# Patient Record
Sex: MALE | Race: WHITE | URBAN - METROPOLITAN AREA
[De-identification: names, ages, dates, MRNs, and addresses within clinical notes are randomized per-mention and may not be internally consistent; named-entity substitution may affect disease eponyms.]

---

## 2020-07-30 DIAGNOSIS — Z11.59 ENCOUNTER FOR SCREENING FOR OTHER VIRAL DISEASES: Primary | ICD-10-CM

## 2020-08-17 DIAGNOSIS — Z11.59 ENCOUNTER FOR SCREENING FOR OTHER VIRAL DISEASES: ICD-10-CM

## 2020-08-17 ASSESSMENT — MIFFLIN-ST. JEOR: SCORE: 1820.76

## 2020-08-18 ENCOUNTER — ANESTHESIA EVENT (OUTPATIENT)
Dept: SURGERY | Facility: AMBULATORY SURGERY CENTER | Age: 35
End: 2020-08-18

## 2020-08-18 LAB
SARS-COV-2 RNA SPEC QL NAA+PROBE: NOT DETECTED
SPECIMEN SOURCE: NORMAL

## 2020-08-18 SDOH — HEALTH STABILITY: MENTAL HEALTH: HOW OFTEN DO YOU HAVE A DRINK CONTAINING ALCOHOL?: NEVER

## 2020-08-19 ENCOUNTER — ANESTHESIA (OUTPATIENT)
Dept: SURGERY | Facility: AMBULATORY SURGERY CENTER | Age: 35
End: 2020-08-19

## 2020-08-19 ENCOUNTER — HOSPITAL ENCOUNTER (OUTPATIENT)
Facility: AMBULATORY SURGERY CENTER | Age: 35
Discharge: HOME | End: 2020-08-19
Attending: PLASTIC SURGERY | Admitting: PLASTIC SURGERY

## 2020-08-19 VITALS
OXYGEN SATURATION: 96 % | TEMPERATURE: 97.6 F | RESPIRATION RATE: 16 BRPM | HEIGHT: 70 IN | WEIGHT: 195 LBS | DIASTOLIC BLOOD PRESSURE: 79 MMHG | HEART RATE: 106 BPM | BODY MASS INDEX: 27.92 KG/M2 | SYSTOLIC BLOOD PRESSURE: 118 MMHG

## 2020-08-19 DIAGNOSIS — R52 PAIN: Primary | ICD-10-CM

## 2020-08-19 DIAGNOSIS — B99.9 INFECTION: ICD-10-CM

## 2020-08-19 DIAGNOSIS — R11.0 NAUSEA AFTER ANESTHESIA, INITIAL ENCOUNTER: ICD-10-CM

## 2020-08-19 DIAGNOSIS — T88.59XA NAUSEA AFTER ANESTHESIA, INITIAL ENCOUNTER: ICD-10-CM

## 2020-08-19 PROCEDURE — 36000148

## 2020-08-19 PROCEDURE — 88305 TISSUE EXAM BY PATHOLOGIST: CPT | Performed by: PLASTIC SURGERY

## 2020-08-19 PROCEDURE — 36000152 ZZH SURGERY LEVEL COSMETIC EACH ADDL 30 MIN OVER QUOTE ESTIMATE

## 2020-08-19 PROCEDURE — G8907 PT DOC NO EVENTS ON DISCHARG: HCPCS

## 2020-08-19 PROCEDURE — G8916 PT W IV AB GIVEN ON TIME: HCPCS

## 2020-08-19 RX ORDER — SODIUM CHLORIDE, SODIUM LACTATE, POTASSIUM CHLORIDE, CALCIUM CHLORIDE 600; 310; 30; 20 MG/100ML; MG/100ML; MG/100ML; MG/100ML
INJECTION, SOLUTION INTRAVENOUS CONTINUOUS
Status: DISCONTINUED | OUTPATIENT
Start: 2020-08-19 | End: 2020-08-20 | Stop reason: HOSPADM

## 2020-08-19 RX ORDER — OXYCODONE AND ACETAMINOPHEN 5; 325 MG/1; MG/1
2 TABLET ORAL
Status: DISCONTINUED | OUTPATIENT
Start: 2020-08-19 | End: 2020-08-20 | Stop reason: HOSPADM

## 2020-08-19 RX ORDER — PROPOFOL 10 MG/ML
INJECTION, EMULSION INTRAVENOUS PRN
Status: DISCONTINUED | OUTPATIENT
Start: 2020-08-19 | End: 2020-08-19

## 2020-08-19 RX ORDER — ONDANSETRON 2 MG/ML
INJECTION INTRAMUSCULAR; INTRAVENOUS PRN
Status: DISCONTINUED | OUTPATIENT
Start: 2020-08-19 | End: 2020-08-19

## 2020-08-19 RX ORDER — ESMOLOL HYDROCHLORIDE 10 MG/ML
INJECTION INTRAVENOUS PRN
Status: DISCONTINUED | OUTPATIENT
Start: 2020-08-19 | End: 2020-08-19

## 2020-08-19 RX ORDER — DEXAMETHASONE SODIUM PHOSPHATE 4 MG/ML
10 INJECTION, SOLUTION INTRA-ARTICULAR; INTRALESIONAL; INTRAMUSCULAR; INTRAVENOUS; SOFT TISSUE
Status: COMPLETED | OUTPATIENT
Start: 2020-08-19 | End: 2020-08-19

## 2020-08-19 RX ORDER — CEFAZOLIN SODIUM 1 G/3ML
1 INJECTION, POWDER, FOR SOLUTION INTRAMUSCULAR; INTRAVENOUS SEE ADMIN INSTRUCTIONS
Status: DISCONTINUED | OUTPATIENT
Start: 2020-08-19 | End: 2020-08-20 | Stop reason: HOSPADM

## 2020-08-19 RX ORDER — ACETAMINOPHEN 500 MG
500-1000 TABLET ORAL EVERY 6 HOURS PRN
COMMUNITY

## 2020-08-19 RX ORDER — GINSENG 100 MG
CAPSULE ORAL PRN
Status: DISCONTINUED | OUTPATIENT
Start: 2020-08-19 | End: 2020-08-19 | Stop reason: HOSPADM

## 2020-08-19 RX ORDER — OXYCODONE HYDROCHLORIDE 5 MG/1
5 TABLET ORAL EVERY 4 HOURS PRN
Status: DISCONTINUED | OUTPATIENT
Start: 2020-08-19 | End: 2020-08-20 | Stop reason: HOSPADM

## 2020-08-19 RX ORDER — PROPOFOL 10 MG/ML
INJECTION, EMULSION INTRAVENOUS CONTINUOUS PRN
Status: DISCONTINUED | OUTPATIENT
Start: 2020-08-19 | End: 2020-08-19

## 2020-08-19 RX ORDER — LABETALOL 20 MG/4 ML (5 MG/ML) INTRAVENOUS SYRINGE
PRN
Status: DISCONTINUED | OUTPATIENT
Start: 2020-08-19 | End: 2020-08-19

## 2020-08-19 RX ORDER — FENTANYL CITRATE 50 UG/ML
INJECTION, SOLUTION INTRAMUSCULAR; INTRAVENOUS PRN
Status: DISCONTINUED | OUTPATIENT
Start: 2020-08-19 | End: 2020-08-19

## 2020-08-19 RX ORDER — BUPIVACAINE HYDROCHLORIDE AND EPINEPHRINE 2.5; 5 MG/ML; UG/ML
INJECTION, SOLUTION INFILTRATION; PERINEURAL PRN
Status: DISCONTINUED | OUTPATIENT
Start: 2020-08-19 | End: 2020-08-19 | Stop reason: HOSPADM

## 2020-08-19 RX ORDER — OXYCODONE AND ACETAMINOPHEN 5; 325 MG/1; MG/1
1-2 TABLET ORAL EVERY 4 HOURS PRN
Qty: 20 TABLET | Refills: 0
Start: 2020-08-19

## 2020-08-19 RX ORDER — FENTANYL CITRATE 50 UG/ML
25-50 INJECTION, SOLUTION INTRAMUSCULAR; INTRAVENOUS EVERY 5 MIN PRN
Status: DISCONTINUED | OUTPATIENT
Start: 2020-08-19 | End: 2020-08-20 | Stop reason: HOSPADM

## 2020-08-19 RX ORDER — LIDOCAINE 40 MG/G
CREAM TOPICAL
Status: DISCONTINUED | OUTPATIENT
Start: 2020-08-19 | End: 2020-08-20 | Stop reason: HOSPADM

## 2020-08-19 RX ORDER — ONDANSETRON 4 MG/1
4 TABLET, ORALLY DISINTEGRATING ORAL
Status: DISCONTINUED | OUTPATIENT
Start: 2020-08-19 | End: 2020-08-20 | Stop reason: HOSPADM

## 2020-08-19 RX ORDER — HYDROXYZINE HYDROCHLORIDE 25 MG/1
25 TABLET, FILM COATED ORAL
Status: DISCONTINUED | OUTPATIENT
Start: 2020-08-19 | End: 2020-08-20 | Stop reason: HOSPADM

## 2020-08-19 RX ORDER — NALOXONE HYDROCHLORIDE 0.4 MG/ML
.1-.4 INJECTION, SOLUTION INTRAMUSCULAR; INTRAVENOUS; SUBCUTANEOUS
Status: DISCONTINUED | OUTPATIENT
Start: 2020-08-19 | End: 2020-08-20 | Stop reason: HOSPADM

## 2020-08-19 RX ORDER — ONDANSETRON 2 MG/ML
4 INJECTION INTRAMUSCULAR; INTRAVENOUS EVERY 30 MIN PRN
Status: DISCONTINUED | OUTPATIENT
Start: 2020-08-19 | End: 2020-08-20 | Stop reason: HOSPADM

## 2020-08-19 RX ORDER — IBUPROFEN 200 MG
200 TABLET ORAL EVERY 4 HOURS PRN
COMMUNITY

## 2020-08-19 RX ORDER — LIDOCAINE HYDROCHLORIDE 20 MG/ML
INJECTION, SOLUTION INFILTRATION; PERINEURAL PRN
Status: DISCONTINUED | OUTPATIENT
Start: 2020-08-19 | End: 2020-08-19

## 2020-08-19 RX ORDER — HYDROXYZINE PAMOATE 25 MG/1
25 CAPSULE ORAL EVERY 6 HOURS PRN
Qty: 30 CAPSULE | Refills: 0
Start: 2020-08-19

## 2020-08-19 RX ORDER — ONDANSETRON 4 MG/1
4 TABLET, ORALLY DISINTEGRATING ORAL EVERY 30 MIN PRN
Status: DISCONTINUED | OUTPATIENT
Start: 2020-08-19 | End: 2020-08-20 | Stop reason: HOSPADM

## 2020-08-19 RX ORDER — HYDROMORPHONE HYDROCHLORIDE 1 MG/ML
.3-.5 INJECTION, SOLUTION INTRAMUSCULAR; INTRAVENOUS; SUBCUTANEOUS EVERY 10 MIN PRN
Status: DISCONTINUED | OUTPATIENT
Start: 2020-08-19 | End: 2020-08-20 | Stop reason: HOSPADM

## 2020-08-19 RX ORDER — CEPHALEXIN 500 MG/1
500 CAPSULE ORAL 2 TIMES DAILY
Qty: 28 CAPSULE | Refills: 0
Start: 2020-08-19 | End: 2020-08-26

## 2020-08-19 RX ORDER — ONDANSETRON 4 MG/1
4-8 TABLET, ORALLY DISINTEGRATING ORAL EVERY 8 HOURS PRN
Qty: 4 TABLET | Refills: 0
Start: 2020-08-19

## 2020-08-19 RX ORDER — CEFAZOLIN SODIUM 2 G/100ML
2 INJECTION, SOLUTION INTRAVENOUS
Status: COMPLETED | OUTPATIENT
Start: 2020-08-19 | End: 2020-08-19

## 2020-08-19 RX ORDER — ACETAMINOPHEN 325 MG/1
975 TABLET ORAL ONCE
Status: COMPLETED | OUTPATIENT
Start: 2020-08-19 | End: 2020-08-19

## 2020-08-19 RX ORDER — MEPERIDINE HYDROCHLORIDE 25 MG/ML
12.5 INJECTION INTRAMUSCULAR; INTRAVENOUS; SUBCUTANEOUS
Status: DISCONTINUED | OUTPATIENT
Start: 2020-08-19 | End: 2020-08-20 | Stop reason: HOSPADM

## 2020-08-19 RX ADMIN — Medication 0.5 MG: at 13:27

## 2020-08-19 RX ADMIN — CEFAZOLIN SODIUM 1 G: 2 INJECTION, SOLUTION INTRAVENOUS at 11:29

## 2020-08-19 RX ADMIN — FENTANYL CITRATE 50 MCG: 50 INJECTION, SOLUTION INTRAMUSCULAR; INTRAVENOUS at 07:44

## 2020-08-19 RX ADMIN — Medication 1 MG: at 09:37

## 2020-08-19 RX ADMIN — OXYCODONE HYDROCHLORIDE 5 MG: 5 TABLET ORAL at 15:06

## 2020-08-19 RX ADMIN — Medication 20 MG: at 07:48

## 2020-08-19 RX ADMIN — SODIUM CHLORIDE, SODIUM LACTATE, POTASSIUM CHLORIDE, CALCIUM CHLORIDE: 600; 310; 30; 20 INJECTION, SOLUTION INTRAVENOUS at 07:14

## 2020-08-19 RX ADMIN — ESMOLOL HYDROCHLORIDE 20 MG: 10 INJECTION INTRAVENOUS at 09:33

## 2020-08-19 RX ADMIN — SODIUM CHLORIDE, SODIUM LACTATE, POTASSIUM CHLORIDE, CALCIUM CHLORIDE: 600; 310; 30; 20 INJECTION, SOLUTION INTRAVENOUS at 09:25

## 2020-08-19 RX ADMIN — SODIUM CHLORIDE, SODIUM LACTATE, POTASSIUM CHLORIDE, CALCIUM CHLORIDE: 600; 310; 30; 20 INJECTION, SOLUTION INTRAVENOUS at 13:02

## 2020-08-19 RX ADMIN — CEFAZOLIN SODIUM 2 G: 2 INJECTION, SOLUTION INTRAVENOUS at 07:29

## 2020-08-19 RX ADMIN — DEXAMETHASONE SODIUM PHOSPHATE 10 MG: 4 INJECTION, SOLUTION INTRA-ARTICULAR; INTRALESIONAL; INTRAMUSCULAR; INTRAVENOUS; SOFT TISSUE at 07:25

## 2020-08-19 RX ADMIN — Medication 1 MG: at 07:49

## 2020-08-19 RX ADMIN — ONDANSETRON 4 MG: 2 INJECTION INTRAMUSCULAR; INTRAVENOUS at 13:24

## 2020-08-19 RX ADMIN — Medication 20 MG: at 09:15

## 2020-08-19 RX ADMIN — Medication 20 MG: at 11:07

## 2020-08-19 RX ADMIN — SODIUM CHLORIDE, SODIUM LACTATE, POTASSIUM CHLORIDE, CALCIUM CHLORIDE: 600; 310; 30; 20 INJECTION, SOLUTION INTRAVENOUS at 12:02

## 2020-08-19 RX ADMIN — ESMOLOL HYDROCHLORIDE 10 MG: 10 INJECTION INTRAVENOUS at 09:14

## 2020-08-19 RX ADMIN — PROPOFOL 200 MCG/KG/MIN: 10 INJECTION, EMULSION INTRAVENOUS at 07:17

## 2020-08-19 RX ADMIN — LABETALOL 20 MG/4 ML (5 MG/ML) INTRAVENOUS SYRINGE 5 MG: at 09:46

## 2020-08-19 RX ADMIN — FENTANYL CITRATE 50 MCG: 50 INJECTION, SOLUTION INTRAMUSCULAR; INTRAVENOUS at 07:17

## 2020-08-19 RX ADMIN — LIDOCAINE HYDROCHLORIDE 60 MG: 20 INJECTION, SOLUTION INFILTRATION; PERINEURAL at 07:17

## 2020-08-19 RX ADMIN — SODIUM CHLORIDE, SODIUM LACTATE, POTASSIUM CHLORIDE, CALCIUM CHLORIDE: 600; 310; 30; 20 INJECTION, SOLUTION INTRAVENOUS at 06:22

## 2020-08-19 RX ADMIN — ESMOLOL HYDROCHLORIDE 10 MG: 10 INJECTION INTRAVENOUS at 09:21

## 2020-08-19 RX ADMIN — CEFAZOLIN SODIUM 1 G: 2 INJECTION, SOLUTION INTRAVENOUS at 09:29

## 2020-08-19 RX ADMIN — CEFAZOLIN SODIUM 1 G: 2 INJECTION, SOLUTION INTRAVENOUS at 13:29

## 2020-08-19 RX ADMIN — SODIUM CHLORIDE, SODIUM LACTATE, POTASSIUM CHLORIDE, CALCIUM CHLORIDE: 600; 310; 30; 20 INJECTION, SOLUTION INTRAVENOUS at 11:15

## 2020-08-19 RX ADMIN — PROPOFOL 200 MG: 10 INJECTION, EMULSION INTRAVENOUS at 07:17

## 2020-08-19 RX ADMIN — Medication 40 MG: at 07:17

## 2020-08-19 RX ADMIN — ACETAMINOPHEN 975 MG: 325 TABLET ORAL at 06:10

## 2020-08-19 RX ADMIN — Medication 20 MG: at 08:41

## 2020-08-19 NOTE — ANESTHESIA POSTPROCEDURE EVALUATION
Anesthesia POST Procedure Evaluation    Patient: Jacob Barrientos   MRN:     [id number] Gender:   male   Age:    34 year old :      1985        Preoperative Diagnosis: Gynecomastia, male [N62]   Procedure(s):  Gynecomastia reduction  LIPOSUCTION chest, abdomen, flanks   Postop Comments: No value filed.     Anesthesia Type: General          Postop Pain Control: Uneventful            Sign Out: Well controlled pain   PONV: No   Neuro/Psych: Uneventful            Sign Out: Acceptable/Baseline neuro status   Airway/Respiratory: Uneventful            Sign Out: Acceptable/Baseline resp. status   CV/Hemodynamics: Uneventful            Sign Out: Acceptable CV status   Other NRE: NONE   DID A NON-ROUTINE EVENT OCCUR? No         Last Anesthesia Record Vitals:  CRNA VITALS  2020 1314 - 2020 1414      2020             Resp Rate (observed):  (!) 2          Last PACU Vitals:  Vitals Value Taken Time   /74 2020  3:00 PM   Temp 36.6  C (97.8  F) 2020  1:45 PM   Pulse 106 2020  3:03 PM   Resp 10 2020  3:03 PM   SpO2 96 % 2020  3:03 PM   Temp src     NIBP     Pulse 94 2020  1:45 PM   SpO2 96 % 2020  1:45 PM   Resp     Temp     Ht Rate     Temp 2     Vitals shown include unvalidated device data.      Electronically Signed By: Carlito Leo MD, 2020, 4:19 PM

## 2020-08-19 NOTE — OR NURSING
Liposuction output 6700ml    Kate Dennis

## 2020-08-19 NOTE — ANESTHESIA CARE TRANSFER NOTE
Patient: Jacob Barrientos    Procedure(s):  Gynecomastia reduction  LIPOSUCTION chest, abdomen, flanks    Diagnosis: Gynecomastia, male [N62]  Diagnosis Additional Information: No value filed.    Anesthesia Type:   No value filed.     Note:  Airway :Face Mask  Patient transferred to:PACU  Comments: Awake, comfortable, sats 99%, Report to RNHandoff Report: Identifed the Patient, Identified the Reponsible Provider, Reviewed the pertinent medical history, Discussed the surgical course, Reviewed Intra-OP anesthesia mangement and issues during anesthesia, Set expectations for post-procedure period and Allowed opportunity for questions and acknowledgement of understanding      Vitals: (Last set prior to Anesthesia Care Transfer)    CRNA VITALS  8/19/2020 1314 - 8/19/2020 1353      8/19/2020             Resp Rate (observed):  (!) 2                Electronically Signed By: KIRILL Roberto CRNA  August 19, 2020  1:53 PM

## 2020-08-19 NOTE — DISCHARGE INSTRUCTIONS
To contact Dr Black call:    [phone] - Day  [phone] - After hours pager    [facility]  Same-Day Surgery   Adult Discharge Orders & Instructions   For 24 hours after surgery  1. Get plenty of rest.  A responsible adult must stay with you for at least 24 hours after you leave the hospital.   2. Do not drive or use heavy equipment.  If you have weakness or tingling, don't drive or use heavy equipment until this feeling goes away.  3. Do not drink alcohol.  4. Avoid strenuous or risky activities.  Ask for help when climbing stairs.   5. You may feel lightheaded.  IF so, sit for a few minutes before standing.  Have someone help you get up.   6. If you have nausea (feel sick to your stomach): Drink only clear liquids such as apple juice, ginger ale, broth or 7-Up.  Rest may also help.  Be sure to drink enough fluids.  Move to a regular diet as you feel able.  7. You may have a slight fever. Call the doctor if your fever is over 100 F (37.7 C) (taken under the tongue) or lasts longer than 24 hours.  8. You may have a dry mouth, a sore throat, muscle aches or trouble sleeping.  These should go away after 24 hours.  9. Do not make important or legal decisions.   Call your doctor for any of the followin.  Signs of infection (fever, growing tenderness at the surgery site, a large amount of drainage or bleeding, severe pain, foul-smelling drainage, redness, swelling).    2. It has been over 8 to 10 hours since surgery and you are still not able to urinate (pass water).    3.  Headache for over 24 hours.

## 2020-08-19 NOTE — ANESTHESIA PREPROCEDURE EVALUATION
"Anesthesia Pre-Procedure Evaluation    Patient: Jacob Barrientos   MRN:     [id number] Gender:   male   Age:    34 year old :      1985        Preoperative Diagnosis: Gynecomastia, male [N62]   Procedure(s):  Gynecomastia reduction  LIPOSUCTION chest, abdomen, flanks     LABS:  CBC: No results found for: WBC, HGB, HCT, PLT  BMP: No results found for: NA, POTASSIUM, CHLORIDE, CO2, BUN, CR, GLC  COAGS: No results found for: PTT, INR, FIBR  POC: No results found for: BGM, HCG, HCGS  OTHER: No results found for: PH, LACT, A1C, CHRISTY, PHOS, MAG, ALBUMIN, PROTTOTAL, ALT, AST, GGT, ALKPHOS, BILITOTAL, BILIDIRECT, LIPASE, AMYLASE, POPEYE, TSH, T4, T3, CRP, SED     Preop Vitals    BP Readings from Last 3 Encounters:   20 118/79    Pulse Readings from Last 3 Encounters:   20 106      Resp Readings from Last 3 Encounters:   20 16    SpO2 Readings from Last 3 Encounters:   20 96%      Temp Readings from Last 1 Encounters:   20 36.4  C (97.6  F)    Ht Readings from Last 1 Encounters:   20 1.778 m (5' 10\")      Wt Readings from Last 1 Encounters:   20 88.5 kg (195 lb)    Estimated body mass index is 27.98 kg/m  as calculated from the following:    Height as of this encounter: 1.778 m (5' 10\").    Weight as of this encounter: 88.5 kg (195 lb).     LDA:  Peripheral IV 20 Left Hand (Active)   Site Assessment WDL 20 1445   Line Status Infusing 20 1345   Phlebitis Scale 0-->no symptoms 20 1345   Infiltration Scale 0 20 1345   Infiltration Site Treatment Method  None 20 0621   Extravasation? No 20 1345   Dressing Intervention New dressing  20 0621   Number of days: 0       Closed/Suction Drain 1 Left Breast Bulb 15 [place] (Active)   Site Description Eccymosis 20 1445   Drainage Appearance Bloody/Bright Red;Serosanguenous 20 1445   Status To bulb suction 20 1445   Output (ml) 15 ml 20 1510   Number of days: 0     "   Closed/Suction Drain 1 Right Breast 15 [place] (Active)   Site Description Eccymosis 08/19/20 1445   Drainage Appearance Bloody/Bright Red;Serosanguenous 08/19/20 1445   Status To bulb suction 08/19/20 1445   Output (ml) 15 ml 08/19/20 1510   Number of days: 0        History reviewed. No pertinent past medical history.   History reviewed. No pertinent surgical history.   No Known Allergies     Anesthesia Evaluation     .             ROS/MED HX    ENT/Pulmonary:  - neg pulmonary ROS     Neurologic:  - neg neurologic ROS     Cardiovascular:  - neg cardiovascular ROS       METS/Exercise Tolerance:     Hematologic:  - neg hematologic  ROS       Musculoskeletal:  - neg musculoskeletal ROS       GI/Hepatic:  - neg GI/hepatic ROS       Renal/Genitourinary:  - ROS Renal section negative       Endo:  - neg endo ROS       Psychiatric:  - neg psychiatric ROS       Infectious Disease:  - neg infectious disease ROS       Malignancy:      - no malignancy   Other:    - neg other ROS                     PHYSICAL EXAM:   Mental Status/Neuro: A/A/O   Airway: Facies: Feasible  Mallampati: I  Mouth/Opening: Full  TM distance: > 6 cm  Neck ROM: Full   Respiratory: Auscultation: CTAB     Resp. Rate: Normal     Resp. Effort: Normal      CV: Rhythm: Regular  Rate: Age appropriate  Heart: Normal Sounds  Edema: None   Comments:      Dental: Normal Dentition                Assessment:   ASA SCORE: 1    H&P: History and physical reviewed and following examination; no interval change.   Smoking Status:  Non-Smoker/Unknown   NPO Status: NPO Appropriate     Plan:   Anes. Type:  General   Pre-Medication: None   Induction:  IV (Standard)   Airway: ETT; Oral   Access/Monitoring: PIV   Maintenance: Balanced     Postop Plan:   Postop Pain: Opioids  Postop Sedation/Airway: Not planned  Disposition: Outpatient     PONV Management:   Adult Risk Factors:, Non-Smoker, Postop Opioids   Prevention: Ondansetron, Dexamethasone, Propofol     CONSENT: Direct  conversation   Plan and risks discussed with: Patient   Blood Products: Consent Deferred (Minimal Blood Loss)                   Carlito Leo MD

## 2020-08-19 NOTE — BRIEF OP NOTE
[facility] Brief Operative Note    Pre-operative diagnosis: Gynecomastia and Localized fat accumulation of abdomen, hips, flanks   Post-operative diagnosis same   Procedure: Procedure(s):  Gynecomastia reduction  LIPOSUCTION chest, abdomen, flanks   Surgeon(s): Surgeon(s) and Role:     * Michele Christian MD - Primary   Estimated blood loss:  125ml   Specimens: ID Type Source Tests Collected by Time Destination   A : right breast tissue 116 grams Tissue Breast, Right SURGICAL PATHOLOGY EXAM Michele Christian MD 8/19/2020  7:57 AM    B : left breast tissue  126 grams Tissue Breast, Left SURGICAL PATHOLOGY EXAM Michele Christian MD 8/19/2020  8:00 AM       Findings: none   Assist: nate Caldera  Complications: none  Condition: extubated and stable to PAR    Michele Christian MD

## 2020-08-26 NOTE — OP NOTE
Procedure Date: 08/19/2020      LOCATION:  [facility].      PREOPERATIVE DIAGNOSES:   1.  Bilateral gynecomastia.   2.  Localized fat accumulation of abdomen, flanks and back.      POSTOPERATIVE DIAGNOSES:   1.  Bilateral gynecomastia.   2.  Localized fat accumulation of abdomen, flanks and back.      PROCEDURES PERFORMED:   1.  Bilateral gynecomastia reduction (adeno-mammectomy) through a superior incision running from 3 o'clock to 9 o'clock at the pigmentary change between the pigmented areola and chest skin.  Through this incision, a bilateral adeno-mammectomy was performed.  Specimen weight:  Right breast 116 grams, left breast 126 grams.  It should be noted that these were sent to the [facility] for final pathology.   2.  Suction-assisted lipectomy of chest, abdomen, flanks and back.  Total suction-assisted lipectomy volume:  6700 mL.  Wetting solution/tumescent fluid utilized:  6000 mL; mixed as follows:  In the first 2 liters, I used a solution consisting of 10 mL of plain Xylocaine and 1.5 mL of 1:100,000 epinephrine in each liter of lactated Ringer's.  In bags 3, 4, 5 and 6, I utilized and 1.75 mL of 1:100,000 epinephrine and 10 mL of lidocaine plain.      ANESTHESIA:  General endotracheal.      INDICATIONS FOR PROCEDURE:  The patient is a 34-year-old male who has been bothered by gynecomastia since his teenage years.  The patient has seen me on several occasions in our clinic for preoperative consultation.  The patient has been shown photographs of other patients who have undergone the combination operation of excision of gynecomastia through a circumareolar approach combined with tapering using suction-assisted lipectomy.  The patient understands the incision and resultant scar will run from 3 o'clock to 9 o'clock at the pigmentary junction between his areola and chest skin.  There will be small additional incisions within his axillary hairline for  exit of closed suction drains.  The patient realizes the risks of gynecomastia reduction include a chance of bleeding, infection, hematoma, seroma, hypertrophic scarring, contour irregularity, asymmetry, and possible dissatisfaction with cosmetic outcome.  He was told the greatest risk with this operation is that of seroma formation.  For this reason, he will have closed suction drains placed at each gynecomastia excision site, which will exit within his axillary hairline.  Postoperatively, the patient will be on oral antibiotics as long as the drains are in place to avoid ascending drain infection.  I ask my patients to wash with Hibiclens provided to them from our clinic in the axilla on a twice-a-day basis.  The patient understands the importance of wearing a compression garment for at least 4-6 weeks following the operation to aid in skin retraction.  The patient has a good deal of lipodystrophy or localized fat accumulation of his abdomen, and I told him I cannot predict how his skin will retract.  The patient was told that postoperatively we will place him in Epifoam and 3 abdominal binders.  This will be on for 24 hours to be removed at his first postoperative appointment 24 hours postoperatively in our clinic.  At that time, he will be fitted into a Genevieve type compression garment, which he will wear until the drains are discontinued at approximately 2-3 weeks postoperatively.  I will then switch him to an [place] type compression garment for an additional 2-3 weeks.  The patient was given a chance to ask questions and all were answered.  The patient provides informed consent for the procedure, both the morning of surgery at [facility] and in our clinic at his preoperative evaluation.      In the preoperative holding area, at [facility], the extent of his gynecomastia was marked using a Sharpie permanent marking pen, as were the areas to be  liposuctioned.  The Sharpie was used to avoid having the marks wash off with the surgical skin prep.      DESCRIPTION OF PROCEDURE AND FINDINGS:  The patient was taken to the operating room, placed in supine position on the operating room table.  Once in the operating room, a successful general endotracheal anesthetic was then induced.  The patient's chest including his axilla was draped in routine sterile fashion.  Starting on the right side, incision was made directly at the pigmentary change at the superior aspect of the nipple areolar complex running from 3 o'clock to 9 o'clock.  Using [place] tip needle cautery, a plane was developed beneath the nipple-areolar complex, which was approximately 1 cm in thickness.  Once the bottom of the nipple areolar complex was reached, dissection was carried out using a Manuelito-coated blade tip cautery, leaving approximately 1 cm of subcutaneous fat on the flaps.  This was done in 360-degree circumferential fashion around the gynecomastia specimen.  A plane was then developed medially at the medial aspect of the clavipectoral fascia.  A plane of dissection followed the clavipectoral fascia from medial to lateral and the specimen was delivered over the serratus anterior muscle.  The right-sided specimen weighed 116 grams and was sent to Pathology.  I should note that all perforating vessels were cauterized using a Medtronic bayonet style guarded electrocautery as I came upon them.  The dissection was essentially bloodless.  The operative site was copiously irrigated using saline containing Ancef.  A saline-soaked sponge was placed over the right-sided dissection.  On the left side, an identical operation was performed.  The patient had a mild asymmetry and the specimen weight indicated this.  The left gynecomastia specimen weighed 126 grams and was sent to Pathology.        At this point, the infusion machine was connected and I infused approximately 800 mL of wetting solution  into his chest on both right and left sides.  This was allowed to remain in place for several minutes for vasoconstriction.  Then, using a combination of 3 and 4 mm cannulas, tapering of the adeno-mammectomy site was made in the surrounding subcutaneous fat.  Special care was taken to follow the lateral aspect of the pectoralis muscle to give it more definition and to liposuction in the subaxillary area.  I also tapered inferiorly towards the rib cage and towards the abdomen.         A Techni-Care-soaked sponge had sitting in the axilla during the entire case and this was now removed.  A 3 mm incision was made within each axilla within the axillary hairline.  The incision was enlarged slightly using mosquito forceps.  A 4 mm curved liposuction cannula was introduced through this incision and into my dissection site.  A #15 fluted Rtavis drain was then introduced over the cannula.  The cannula was withdrawn to the axilla, where the drain was tied in place using 2-0 silk suture in half-hitch fashion.  The exact same drain placement was then performed on the left side.         A search for bleeding points was made and hemostasis was excellent.  Closure consisted of 3-0 Vicryl sutures in the deep subcutaneous layer.  Deep dermal sutures were placed using 4-0 Monocryl suture in buried interrupted fashion.  The skin was then run using 3-0 Monocryl suture in running and buried continuous intracuticular fashion.  Next, 6-0 Prolene sutures were placed as needed for exact coaptation of the skin.  Nitro-Bid paste was placed on the nipple-areolar complex, followed by 3M Tegaderm dressings.        At this point, the patient was prepped and draped for liposuction from his chest to his knees and circumferentially.  Sequential compression stockings which had been on since prior to induction were prepped out of the field sterilely using stockinette and sterile Coban.  Once prepping and draping was completed, a second timeout was  called for the liposuction portion of the operation.  Wetting solution was infiltrated through incisions within his pubic hair and under the quezada of the umbilicus.  I infiltrated into his upper abdomen towards the chest, lower abdomen and towards the right and left flanks.  The patient was then tilted to his side and I infused wetting solution into his right hip, flank and back areas.  I then tilted the patient to his right and infused wetting solution into his left hip, flank and back areas.      At this point, the liposuction began.  I used the SAFE liposuction technique as described by Rolly Glass MD from [place].  The technique uses a Rodgers basket cannula on non-suction mode to perform the separation phase of SAFE liposuction.  This was performed over his entire abdomen and tapered into the flanks and over his rib cage.  I then performed the aspiration phase of SAFE liposuction using curved Mladick cannulas, which are designed to follow the natural curvature of the abdomen and waistline.  A very even suction lipectomy was performed on his abdomen.  I then liposuctioned the area of his pubic hair and mons pubis.         At this point, the patient was turned to a left lateral decubitus position on the operating room table.  Anesthesia placed an axillary roll in the patient's left axilla.  The right arm was brought over the top and placed over a pillow so as to avoid any traction of the brachial plexus.  In this left lateral decubitus position, I again used the SAFE liposuction technique.  The incision over the crest of his hip at the latissimus insertion and in the subaxillary area were used.  I used the Rodgers basket cannula on non-suction mode to perform the separation phase of SAFE liposuction.  I then performed the aspiration phase of SAFE liposuction using curved Mladick cannulas designed to follow the curvature of his waistline, paraspinal musculature and rib cage.  A very even suction-assisted lipectomy  was performed of his right hip, flank and back.  The liposuction access incisions were closed using 5-0 Monocryl suture in buried interrupted intercuticular fashion and 5-0 Prolene suture in simple interrupted fashion in the skin.  These were then dressed using Mastisol and a 1/4 quarter-inch [place] of bacitracin over the suture line, followed by 3M Tegaderm dressings.  The patient was then turned back to supine position, then to a right lateral decubitus position.  Anesthesia placed an axillary roll in the patient's right axilla.  The left arm was brought over the top and placed over the right arm board on a pillow so as to avoid any traction of the brachial plexus.  In this left lateral decubitus position, I performed the exact same technique as on the right using the SAFE liposuction technique and a Rodgers basket cannula on non-suction mode to perform the separation phase of SAFE liposuction.  The aspiration phase of SAFE liposuction was performed using curved Mladick cannulas, which follow the natural curvature of the patient's waistline, paraspinal musculature and rib cage.  Liposuction access incisions were closed and dressed in the identical fashion to that previously described.  The patient was then returned to a supine position on the operating room table.  The liposuction access incisions within his pubic hair and at his umbilicus were closed.  Attention was then directed back to his chest, where I applied Mastisol at his gynecomastia excision sites and 3M Steri-Strips in bernard-hexagonal fashion.  I then applied Nitro-Bid paste, Adaptic and Epifoam.  Epifoam was also placed in all areas where liposuction was performed.  The patient was placed in a total of 3 abdominal binders for additional compression.  The patient was extubated in the operating room and taken in stable condition.      ESTIMATED BLOOD LOSS:  125 mL      COMPLICATIONS:  None.      CONDITION:  Stable to postanesthesia recovery.       OPERATIVE TIMES:  Room in time:  7:15 a.m.  Operative start:  7:37 a.m.  Timeout was called at 7:36 a.m.  Procedure end time:  1:33 p.m.  Out of room time:  1:45 p.m.  It should be noted the patient paid for 6 hours of paid surgical operative time.  The operation was completed in 5 hours and 54 minutes of actual surgical time, so the operation was completed ahead of paid surgical time and the patient should see no postoperative bills.         ROBERTO TORRES MD             D: 2020   T: 2020   MT: MERLE      Name:     NAYA THOMAS   MRN:      [id number]        Account:        [id number]   :      1985           Procedure Date: 2020      Document: [id number]

## 2020-09-02 LAB — COPATH REPORT: NORMAL

## 2021-01-04 ENCOUNTER — HEALTH MAINTENANCE LETTER (OUTPATIENT)
Age: 36
End: 2021-01-04

## 2021-10-11 ENCOUNTER — HEALTH MAINTENANCE LETTER (OUTPATIENT)
Age: 36
End: 2021-10-11

## 2022-01-30 ENCOUNTER — HEALTH MAINTENANCE LETTER (OUTPATIENT)
Age: 37
End: 2022-01-30

## 2022-09-24 ENCOUNTER — HEALTH MAINTENANCE LETTER (OUTPATIENT)
Age: 37
End: 2022-09-24

## 2023-05-08 ENCOUNTER — HEALTH MAINTENANCE LETTER (OUTPATIENT)
Age: 38
End: 2023-05-08

## (undated) DEVICE — SU VICRYL 3-0 SH 27" UND J416H

## (undated) DEVICE — SU MONOCRYL 4-0 P-3 18" UND Y494G

## (undated) DEVICE — DRSG KERLIX FLUFFS X5

## (undated) DEVICE — SUCTION TIP YANKAUER W/O VENT K86

## (undated) DEVICE — GLOVE PROTEXIS BLUE W/NEU-THERA 7.0  2D73EB70

## (undated) DEVICE — ADH LIQUID MASTISOL TOPICAL VIAL 2-3ML 0523-48

## (undated) DEVICE — Device

## (undated) DEVICE — SUCTION CANISTER MEDIVAC LINER 1500ML W/LID 65651-515

## (undated) DEVICE — DRAPE SPLIT EENT 76X124" 3X28" 9447

## (undated) DEVICE — MARKER SKIN DOUBLE TIP W/FLEXI-RULER W/LABELS

## (undated) DEVICE — ESU NDL COLORADO MICRO 3CM STR N103A

## (undated) DEVICE — DRAPE BREAST/CHEST 29420

## (undated) DEVICE — ESU GROUND PAD ADULT W/CORD E7507

## (undated) DEVICE — GLOVE PROTEXIS W/NEU-THERA 6.5  2D73TE65

## (undated) DEVICE — SU MONOCRYL 5-0 P-3 18" UND Y493G

## (undated) DEVICE — SU PROLENE 4-0 PS-2 18" 8682G

## (undated) DEVICE — SU PROLENE 5-0 P-3 18" 8698G

## (undated) DEVICE — ESU PENCIL SMOKE EVAC W/ROCKER SWITCH 0703-047-000

## (undated) DEVICE — CATH TRAY FOLEY SURESTEP 16FR DRAIN BAG STATOCK A899916

## (undated) DEVICE — STPL SKIN 35W 054887

## (undated) DEVICE — TUBING INFUSION INFILTRATION LIPOSUCTION 156" 24-6008

## (undated) DEVICE — DRSG STERI STRIP 1/2X4" R1547

## (undated) DEVICE — DRAIN JACKSON PRATT CHANNEL 15FR ROUND HUBLESS SIL JP-2228

## (undated) DEVICE — SOL WATER IRRIG 1000ML BOTTLE 07139-09

## (undated) DEVICE — PREP CHLORAPREP 26ML TINTED ORANGE  260815

## (undated) DEVICE — DRAPE SHEET HALF 40X60" 9358

## (undated) DEVICE — SYR BULB IRRIG DOVER 60 ML LATEX FREE 67000

## (undated) DEVICE — DRAPE STOCKINETTE IMPERVIOUS 12" 1587

## (undated) DEVICE — DRSG TEGADERM 2 3/8X2 3/4" 1624W

## (undated) DEVICE — NDL SPINAL 25GA 3.5" QUINCKE 405180

## (undated) DEVICE — GLOVE PROTEXIS W/NEU-THERA 7.5  2D73TE75

## (undated) DEVICE — SU PDS II 4-0 P-3 18" Z494G

## (undated) DEVICE — SPONGE LAP 18X18" 1515

## (undated) DEVICE — NDL 19GA 1.5"

## (undated) DEVICE — PACK MINOR SBA15MIFSE

## (undated) DEVICE — DRAIN JACKSON PRATT RESERVOIR 100ML SU130-1305

## (undated) DEVICE — TUBE SMOKE EVAC 7/8"X10 (STERILE)

## (undated) DEVICE — SU SILK 2-0 FSL 18" 677G

## (undated) DEVICE — ESU ELEC BLADE 2.75" COATED/INSULATED E1455

## (undated) DEVICE — SUCTION TUBING 10' N1010

## (undated) DEVICE — SYR 10ML LL W/O NDL

## (undated) DEVICE — DRSG ADAPTIC 3X3" 6112

## (undated) DEVICE — DRAPE SHEET 3/4 78X60"

## (undated) DEVICE — DECANTER TRANSFER DEVICE 2008S

## (undated) RX ORDER — PROPOFOL 10 MG/ML
INJECTION, EMULSION INTRAVENOUS
Status: DISPENSED
Start: 2020-08-19

## (undated) RX ORDER — CEFAZOLIN SODIUM 1 G/3ML
INJECTION, POWDER, FOR SOLUTION INTRAMUSCULAR; INTRAVENOUS
Status: DISPENSED
Start: 2020-08-19

## (undated) RX ORDER — DEXAMETHASONE SODIUM PHOSPHATE 4 MG/ML
INJECTION, SOLUTION INTRA-ARTICULAR; INTRALESIONAL; INTRAMUSCULAR; INTRAVENOUS; SOFT TISSUE
Status: DISPENSED
Start: 2020-08-19

## (undated) RX ORDER — ONDANSETRON 2 MG/ML
INJECTION INTRAMUSCULAR; INTRAVENOUS
Status: DISPENSED
Start: 2020-08-19

## (undated) RX ORDER — ESMOLOL HYDROCHLORIDE 10 MG/ML
INJECTION INTRAVENOUS
Status: DISPENSED
Start: 2020-08-19

## (undated) RX ORDER — ACETAMINOPHEN 325 MG/1
TABLET ORAL
Status: DISPENSED
Start: 2020-08-19

## (undated) RX ORDER — HYDROMORPHONE HYDROCHLORIDE 2 MG/ML
INJECTION, SOLUTION INTRAMUSCULAR; INTRAVENOUS; SUBCUTANEOUS
Status: DISPENSED
Start: 2020-08-19

## (undated) RX ORDER — LIDOCAINE HYDROCHLORIDE 10 MG/ML
INJECTION, SOLUTION EPIDURAL; INFILTRATION; INTRACAUDAL; PERINEURAL
Status: DISPENSED
Start: 2020-08-19

## (undated) RX ORDER — LIDOCAINE HYDROCHLORIDE 20 MG/ML
INJECTION, SOLUTION INFILTRATION; PERINEURAL
Status: DISPENSED
Start: 2020-08-19

## (undated) RX ORDER — EPINEPHRINE 1 MG/ML
INJECTION, SOLUTION INTRAMUSCULAR; SUBCUTANEOUS
Status: DISPENSED
Start: 2020-08-19

## (undated) RX ORDER — OXYCODONE HYDROCHLORIDE 5 MG/1
TABLET ORAL
Status: DISPENSED
Start: 2020-08-19

## (undated) RX ORDER — FENTANYL CITRATE 50 UG/ML
INJECTION, SOLUTION INTRAMUSCULAR; INTRAVENOUS
Status: DISPENSED
Start: 2020-08-19

## (undated) RX ORDER — LABETALOL HYDROCHLORIDE 5 MG/ML
INJECTION, SOLUTION INTRAVENOUS
Status: DISPENSED
Start: 2020-08-19

## (undated) RX ORDER — GINSENG 100 MG
CAPSULE ORAL
Status: DISPENSED
Start: 2020-08-19

## (undated) RX ORDER — CEFAZOLIN SODIUM 2 G/100ML
INJECTION, SOLUTION INTRAVENOUS
Status: DISPENSED
Start: 2020-08-19

## (undated) RX ORDER — BUPIVACAINE HYDROCHLORIDE AND EPINEPHRINE 2.5; 5 MG/ML; UG/ML
INJECTION, SOLUTION INFILTRATION; PERINEURAL
Status: DISPENSED
Start: 2020-08-19